# Patient Record
Sex: FEMALE | Race: WHITE | Employment: OTHER | URBAN - METROPOLITAN AREA
[De-identification: names, ages, dates, MRNs, and addresses within clinical notes are randomized per-mention and may not be internally consistent; named-entity substitution may affect disease eponyms.]

---

## 2022-03-19 PROBLEM — I10 ESSENTIAL HYPERTENSION: Status: ACTIVE | Noted: 2018-03-02

## 2022-03-19 PROBLEM — M47.818 ARTHRITIS OF SACRUM: Status: ACTIVE | Noted: 2018-03-02

## 2024-03-07 RX ORDER — GABAPENTIN 300 MG/1
300 CAPSULE ORAL 3 TIMES DAILY
COMMUNITY

## 2024-03-07 RX ORDER — LEVETIRACETAM 500 MG/1
500 TABLET ORAL 2 TIMES DAILY
COMMUNITY

## 2024-03-07 RX ORDER — OXYCODONE HYDROCHLORIDE 5 MG/1
5 TABLET ORAL 2 TIMES DAILY
COMMUNITY

## 2024-03-07 RX ORDER — METAXALONE 800 MG/1
800 TABLET ORAL 2 TIMES DAILY
COMMUNITY

## 2024-03-07 RX ORDER — LORATADINE 10 MG/1
10 CAPSULE, LIQUID FILLED ORAL DAILY
COMMUNITY

## 2024-03-07 NOTE — PROGRESS NOTES
Instructions for your surgery at [facility]      Today's Date:  3/7/2024      Patient's Name:  Abhishek Mattson           Surgery Date:  03/21/2024              Please enter the main entrance of the hospital and check-in at the  located in the lobby. Once checked in at the , you will take the elevators to the second floor, and report to the waiting room on the left. The room will say Procedure Registration.    Do NOT eat or drink anything, including candy, gum, or ice chips after midnight prior to your surgery, unless you have specific instructions from your surgeon or anesthesia provider to do so.  Brush your teeth before coming to the hospital. You may swish with water, but do not swallow.  No smoking/Vaping/E-Cigarettes 24 hours prior to the day of surgery.  No alcohol 24 hours prior to the day of surgery.  No recreational drugs for one week prior to the day of surgery.  Bring Photo ID, Insurance information, and Co-pay if required on day of surgery.  Bring in pertinent legal documents, such as, Medical Power of , DNR, Advance Directive, etc.  Leave all valuables, including money/purse, at home.  Remove all jewelry, including ALL body piercings, nail polish, acrylic nails, and makeup (including mascara); no lotions, powders, deodorant, or perfume/cologne/after shave on the skin.  Follow instruction for Hibiclens washes and CHG wipes from surgeon's office.   Glasses and dentures may be worn to the hospital. They must be removed prior to surgery. Please bring case/container for glasses or dentures.   Contact lenses should not be worn on day of surgery.   Call your doctor's office if symptoms of a cold or illness develop within 24-48 hours prior to your surgery.  Call your doctor's office if you have any questions concerning insurance or co-pays.  15. AN ADULT (relative or friend 18 years or older) MUST DRIVE YOU HOME AFTER YOUR SURGERY.  16. Please make

## 2024-03-18 ENCOUNTER — ANESTHESIA EVENT (OUTPATIENT)
Facility: HOSPITAL | Age: 64
End: 2024-03-18
Payer: OTHER GOVERNMENT

## 2024-03-21 ENCOUNTER — ANESTHESIA (OUTPATIENT)
Facility: HOSPITAL | Age: 64
End: 2024-03-21
Payer: OTHER GOVERNMENT

## 2024-03-21 ENCOUNTER — HOSPITAL ENCOUNTER (OUTPATIENT)
Facility: HOSPITAL | Age: 64
Setting detail: OUTPATIENT SURGERY
Discharge: HOME | End: 2024-03-21
Attending: DENTIST | Admitting: DENTIST
Payer: OTHER GOVERNMENT

## 2024-03-21 VITALS
BODY MASS INDEX: 36.46 KG/M2 | HEIGHT: 65 IN | SYSTOLIC BLOOD PRESSURE: 159 MMHG | WEIGHT: 218.8 LBS | TEMPERATURE: 98.8 F | DIASTOLIC BLOOD PRESSURE: 76 MMHG | HEART RATE: 84 BPM | RESPIRATION RATE: 16 BRPM | OXYGEN SATURATION: 94 %

## 2024-03-21 PROCEDURE — 6370000000 HC RX 637 (ALT 250 FOR IP): Performed by: NURSE ANESTHETIST, CERTIFIED REGISTERED

## 2024-03-21 PROCEDURE — 7100000000 HC PACU RECOVERY - FIRST 15 MIN: Performed by: DENTIST

## 2024-03-21 PROCEDURE — 2580000003 HC RX 258: Performed by: NURSE ANESTHETIST, CERTIFIED REGISTERED

## 2024-03-21 PROCEDURE — 7100000001 HC PACU RECOVERY - ADDTL 15 MIN: Performed by: DENTIST

## 2024-03-21 PROCEDURE — 3700000001 HC ADD 15 MINUTES (ANESTHESIA): Performed by: DENTIST

## 2024-03-21 PROCEDURE — 2709999900 HC NON-CHARGEABLE SUPPLY: Performed by: DENTIST

## 2024-03-21 PROCEDURE — 82962 GLUCOSE BLOOD TEST: CPT

## 2024-03-21 PROCEDURE — 3600000002 HC SURGERY LEVEL 2 BASE: Performed by: DENTIST

## 2024-03-21 PROCEDURE — 3700000000 HC ANESTHESIA ATTENDED CARE: Performed by: DENTIST

## 2024-03-21 PROCEDURE — 6360000002 HC RX W HCPCS: Performed by: NURSE ANESTHETIST, CERTIFIED REGISTERED

## 2024-03-21 PROCEDURE — 7100000010 HC PHASE II RECOVERY - FIRST 15 MIN: Performed by: DENTIST

## 2024-03-21 PROCEDURE — 7100000011 HC PHASE II RECOVERY - ADDTL 15 MIN: Performed by: DENTIST

## 2024-03-21 PROCEDURE — 2500000003 HC RX 250 WO HCPCS: Performed by: NURSE ANESTHETIST, CERTIFIED REGISTERED

## 2024-03-21 PROCEDURE — 3600000012 HC SURGERY LEVEL 2 ADDTL 15MIN: Performed by: DENTIST

## 2024-03-21 PROCEDURE — 2500000003 HC RX 250 WO HCPCS: Performed by: DENTIST

## 2024-03-21 RX ORDER — IBUPROFEN 800 MG/1
800 TABLET ORAL EVERY 6 HOURS PRN
Qty: 25 TABLET | Refills: 1 | Status: SHIPPED | OUTPATIENT
Start: 2024-03-21 | End: 2024-03-26

## 2024-03-21 RX ORDER — OXYCODONE HYDROCHLORIDE 5 MG/1
5 TABLET ORAL
Status: COMPLETED | OUTPATIENT
Start: 2024-03-21 | End: 2024-03-21

## 2024-03-21 RX ORDER — FENTANYL CITRATE 50 UG/ML
INJECTION, SOLUTION INTRAMUSCULAR; INTRAVENOUS PRN
Status: DISCONTINUED | OUTPATIENT
Start: 2024-03-21 | End: 2024-03-21 | Stop reason: SDUPTHER

## 2024-03-21 RX ORDER — NALOXONE HYDROCHLORIDE 0.4 MG/ML
INJECTION, SOLUTION INTRAMUSCULAR; INTRAVENOUS; SUBCUTANEOUS PRN
Status: DISCONTINUED | OUTPATIENT
Start: 2024-03-21 | End: 2024-03-21 | Stop reason: HOSPADM

## 2024-03-21 RX ORDER — SODIUM CHLORIDE, SODIUM LACTATE, POTASSIUM CHLORIDE, CALCIUM CHLORIDE 600; 310; 30; 20 MG/100ML; MG/100ML; MG/100ML; MG/100ML
INJECTION, SOLUTION INTRAVENOUS CONTINUOUS
Status: DISCONTINUED | OUTPATIENT
Start: 2024-03-21 | End: 2024-03-21 | Stop reason: HOSPADM

## 2024-03-21 RX ORDER — PROCHLORPERAZINE EDISYLATE 5 MG/ML
5 INJECTION INTRAMUSCULAR; INTRAVENOUS
Status: DISCONTINUED | OUTPATIENT
Start: 2024-03-21 | End: 2024-03-21 | Stop reason: HOSPADM

## 2024-03-21 RX ORDER — LIDOCAINE HYDROCHLORIDE 20 MG/ML
INJECTION, SOLUTION EPIDURAL; INFILTRATION; INTRACAUDAL; PERINEURAL PRN
Status: DISCONTINUED | OUTPATIENT
Start: 2024-03-21 | End: 2024-03-21 | Stop reason: SDUPTHER

## 2024-03-21 RX ORDER — METOCLOPRAMIDE HYDROCHLORIDE 5 MG/ML
10 INJECTION INTRAMUSCULAR; INTRAVENOUS
Status: DISCONTINUED | OUTPATIENT
Start: 2024-03-21 | End: 2024-03-21 | Stop reason: HOSPADM

## 2024-03-21 RX ORDER — PROPOFOL 10 MG/ML
INJECTION, EMULSION INTRAVENOUS PRN
Status: DISCONTINUED | OUTPATIENT
Start: 2024-03-21 | End: 2024-03-21 | Stop reason: SDUPTHER

## 2024-03-21 RX ORDER — LIDOCAINE HYDROCHLORIDE 10 MG/ML
1 INJECTION, SOLUTION EPIDURAL; INFILTRATION; INTRACAUDAL; PERINEURAL
Status: DISCONTINUED | OUTPATIENT
Start: 2024-03-21 | End: 2024-03-21 | Stop reason: HOSPADM

## 2024-03-21 RX ORDER — ACETAMINOPHEN 325 MG/1
650 TABLET ORAL
Status: DISCONTINUED | OUTPATIENT
Start: 2024-03-21 | End: 2024-03-21 | Stop reason: HOSPADM

## 2024-03-21 RX ORDER — MIDAZOLAM HYDROCHLORIDE 1 MG/ML
INJECTION INTRAMUSCULAR; INTRAVENOUS PRN
Status: DISCONTINUED | OUTPATIENT
Start: 2024-03-21 | End: 2024-03-21 | Stop reason: SDUPTHER

## 2024-03-21 RX ORDER — FAMOTIDINE 20 MG/1
20 TABLET, FILM COATED ORAL ONCE
Status: COMPLETED | OUTPATIENT
Start: 2024-03-21 | End: 2024-03-21

## 2024-03-21 RX ORDER — MEPERIDINE HYDROCHLORIDE 25 MG/ML
12.5 INJECTION INTRAMUSCULAR; INTRAVENOUS; SUBCUTANEOUS EVERY 5 MIN PRN
Status: DISCONTINUED | OUTPATIENT
Start: 2024-03-21 | End: 2024-03-21 | Stop reason: HOSPADM

## 2024-03-21 RX ORDER — SUCCINYLCHOLINE/SOD CL,ISO/PF 100 MG/5ML
SYRINGE (ML) INTRAVENOUS PRN
Status: DISCONTINUED | OUTPATIENT
Start: 2024-03-21 | End: 2024-03-21 | Stop reason: SDUPTHER

## 2024-03-21 RX ORDER — CLINDAMYCIN HYDROCHLORIDE 300 MG/1
300 CAPSULE ORAL 3 TIMES DAILY
Qty: 21 CAPSULE | Refills: 0 | Status: SHIPPED | OUTPATIENT
Start: 2024-03-21 | End: 2024-03-28

## 2024-03-21 RX ORDER — ONDANSETRON 2 MG/ML
INJECTION INTRAMUSCULAR; INTRAVENOUS PRN
Status: DISCONTINUED | OUTPATIENT
Start: 2024-03-21 | End: 2024-03-21 | Stop reason: SDUPTHER

## 2024-03-21 RX ORDER — FENTANYL CITRATE 50 UG/ML
25 INJECTION, SOLUTION INTRAMUSCULAR; INTRAVENOUS EVERY 5 MIN PRN
Status: DISCONTINUED | OUTPATIENT
Start: 2024-03-21 | End: 2024-03-21 | Stop reason: HOSPADM

## 2024-03-21 RX ORDER — LIDOCAINE HYDROCHLORIDE AND EPINEPHRINE 10; 10 MG/ML; UG/ML
INJECTION, SOLUTION INFILTRATION; PERINEURAL PRN
Status: DISCONTINUED | OUTPATIENT
Start: 2024-03-21 | End: 2024-03-21 | Stop reason: HOSPADM

## 2024-03-21 RX ORDER — ROCURONIUM BROMIDE 10 MG/ML
INJECTION, SOLUTION INTRAVENOUS PRN
Status: DISCONTINUED | OUTPATIENT
Start: 2024-03-21 | End: 2024-03-21 | Stop reason: SDUPTHER

## 2024-03-21 RX ADMIN — Medication 100 MG: at 08:36

## 2024-03-21 RX ADMIN — SODIUM CHLORIDE, POTASSIUM CHLORIDE, SODIUM LACTATE AND CALCIUM CHLORIDE: 600; 310; 30; 20 INJECTION, SOLUTION INTRAVENOUS at 07:37

## 2024-03-21 RX ADMIN — OXYCODONE HYDROCHLORIDE 5 MG: 5 TABLET ORAL at 14:39

## 2024-03-21 RX ADMIN — LIDOCAINE HYDROCHLORIDE 50 MG: 20 INJECTION, SOLUTION EPIDURAL; INFILTRATION; INTRACAUDAL; PERINEURAL at 08:36

## 2024-03-21 RX ADMIN — FAMOTIDINE 20 MG: 20 TABLET ORAL at 07:38

## 2024-03-21 RX ADMIN — FENTANYL CITRATE 25 MCG: 50 INJECTION INTRAMUSCULAR; INTRAVENOUS at 12:15

## 2024-03-21 RX ADMIN — ONDANSETRON 4 MG: 2 INJECTION INTRAMUSCULAR; INTRAVENOUS at 08:52

## 2024-03-21 RX ADMIN — PROPOFOL 150 MG: 10 INJECTION, EMULSION INTRAVENOUS at 08:36

## 2024-03-21 RX ADMIN — FENTANYL CITRATE 50 MCG: 50 INJECTION INTRAMUSCULAR; INTRAVENOUS at 08:36

## 2024-03-21 RX ADMIN — ROCURONIUM BROMIDE 5 MG: 10 INJECTION, SOLUTION INTRAVENOUS at 08:36

## 2024-03-21 RX ADMIN — MIDAZOLAM 2 MG: 1 INJECTION, SOLUTION INTRAMUSCULAR; INTRAVENOUS at 08:29

## 2024-03-21 ASSESSMENT — PAIN SCALES - GENERAL
PAINLEVEL_OUTOF10: 6

## 2024-03-21 ASSESSMENT — PAIN DESCRIPTION - LOCATION
LOCATION: TEETH
LOCATION: MOUTH;EAR
LOCATION: MOUTH;EAR
LOCATION: EAR;MOUTH

## 2024-03-21 ASSESSMENT — PAIN DESCRIPTION - DESCRIPTORS
DESCRIPTORS: SHARP

## 2024-03-21 ASSESSMENT — PAIN DESCRIPTION - ORIENTATION
ORIENTATION: RIGHT

## 2024-03-21 ASSESSMENT — PAIN - FUNCTIONAL ASSESSMENT: PAIN_FUNCTIONAL_ASSESSMENT: 0-10

## 2024-03-21 NOTE — ANESTHESIA PRE PROCEDURE
Department of Anesthesiology  Preprocedure Note       Name:  Abhishek Mattson   Age:  63 y.o.  :  1960                                          MRN:  [id number]         Date:  3/21/2024      Surgeon: Surgeon(s):  Govind Aguiar DDS    Procedure: Procedure(s):  EXTRACT TEETH #1,16,17,32    Medications prior to admission:   Prior to Admission medications    Medication Sig Start Date End Date Taking? Authorizing Provider   loratadine (CLARITIN) 10 MG capsule Take 1 capsule by mouth daily   Yes Provider, MD Catarina   gabapentin (NEURONTIN) 300 MG capsule Take 1 capsule by mouth 3 times daily.   Yes Provider, MD Catarina   metaxalone (SKELAXIN) 800 MG tablet Take 1 tablet by mouth 2 times daily   Yes ProviderCatarina MD   oxyCODONE (ROXICODONE) 5 MG immediate release tablet Take 1 tablet by mouth in the morning and at bedtime.   Yes ProviderCatarina MD   levETIRAcetam (KEPPRA) 500 MG tablet Take 1 tablet by mouth 2 times daily   Yes Provider, MD Catarina       Current medications:    Current Facility-Administered Medications   Medication Dose Route Frequency Provider Last Rate Last Admin   • lidocaine PF 1 % injection 1 mL  1 mL IntraDERmal Once PRN Magaly Robles APRN - CRNA       • lactated ringers IV soln infusion   IntraVENous Continuous Magaly Robles APRN - CRNA 125 mL/hr at 24 0737 New Bag at 24 0737       Allergies:    Allergies   Allergen Reactions   • Latex Swelling   • Pcn [Penicillins] Anaphylaxis   • Aspartame Seizure   • Gluten Seizure   • Vancomycin Swelling   • Hydrocodone Nausea And Vomiting   • Mobic [Meloxicam] Nausea And Vomiting and Dizziness or Vertigo   • Oat Diarrhea and Nausea And Vomiting   • Red Dye Nausea And Vomiting, Swelling and Rash       Problem List:    Patient Active Problem List   Diagnosis Code   • Arthritis of sacrum M47.818   • Lumbar radicular pain M54.16   • Cervical radicular pain M54.12   • Pseudoseizure F44.5   • Essential

## 2024-03-21 NOTE — BRIEF OP NOTE
Brief Postoperative Note      Patient: Abhishek Mattson  YOB: 1960  MRN: [id number]    Date of Procedure: 3/21/2024    Pre-Op Diagnosis Codes:     * Unerupted tooth [K00.6]     * Impacted tooth [K01.1]    Post-Op Diagnosis: Same       Procedure(s):  EXTRACT TEETH #1,16,17,32    Surgeon(s):  Govind Aguiar DDS    Assistant:  * No surgical staff found *    Anesthesia: General    Estimated Blood Loss (mL): Minimal    Complications: None    Specimens:   * No specimens in log *    Implants:  * No implants in log *      Drains: * No LDAs found *    Findings: caries      Electronically signed by Govind Aguiar DDS on 3/21/2024 at 9:26 AM

## 2024-03-21 NOTE — PERIOP NOTE
Patient /Family /Designee has been informed that [facility] is not responsible for patient belongings per policy and the signed [facility] Patient Agreement document.  Personal items should be sent home or checked in with security.  Patient /Family /Designee selected the following action:                            [x]  Send personal items home with a family member or friend                                                 []  Check in personal items with security, excluding clothing                            []  Maintain personal items at the bedside, against recommendation                                 by Alcides Lindquist [facility]                                   ** If patient /family /designee chooses to maintain personal items at the bedside,                                      Complete the patient belongings inventory in the EMR.   Belongings are with patient's , Arnol Mattson.  Contact number: [phone]

## 2024-03-21 NOTE — DISCHARGE SUMMARY
[unfilled]    Discharge Summary     Patient: Abhishek Mattson MRN: [id number]  SSN: [id number]    YOB: 1960  Age: 63 y.o.  Sex: female       Admit Date: 3/21/2024    Discharge Date: 3/21/2024      Admission Diagnoses: Unerupted tooth [K00.6]  Impacted tooth [K01.1]    Discharge Diagnoses:      Discharge Condition: Good    Hospital Course: sds    Consults: None    Significant Diagnostic Studies: labs:     Disposition: home    Discharge Medications:   Current Discharge Medication List        START taking these medications    Details   clindamycin (CLEOCIN) 300 MG capsule Take 1 capsule by mouth 3 times daily for 7 days  Qty: 21 capsule, Refills: 0      ibuprofen (ADVIL;MOTRIN) 800 MG tablet Take 1 tablet by mouth every 6 hours as needed for Pain  Qty: 25 tablet, Refills: 1           CONTINUE these medications which have NOT CHANGED    Details   loratadine (CLARITIN) 10 MG capsule Take 1 capsule by mouth daily      gabapentin (NEURONTIN) 300 MG capsule Take 1 capsule by mouth 3 times daily.      metaxalone (SKELAXIN) 800 MG tablet Take 1 tablet by mouth 2 times daily      oxyCODONE (ROXICODONE) 5 MG immediate release tablet Take 1 tablet by mouth in the morning and at bedtime.      levETIRAcetam (KEPPRA) 500 MG tablet Take 1 tablet by mouth 2 times daily             Activity: activity as tolerated and no driving for today  Diet: clear liquids, advance as tolerated  Wound Care: as directed    No follow-ups on file.    Signed By: Govind Aguiar DDS     March 21, 2024    [unfilled]

## 2024-03-21 NOTE — OP NOTE
[unfilled]   Operative Report    Patient: Abhishek Mattson MRN: [id number]  SSN: [id number]    YOB: 1960  Age: 63 y.o.  Sex: female       Date of Surgery: [unfilled]     Preoperative Diagnosis: Unerupted tooth [K00.6]  Impacted tooth [K01.1]     Postoperative Diagnosis: * No post-op diagnosis entered *     Surgeon(s) and Role:     * Govind Aguiar DDS - Primary    Anesthesia: General     Procedure: Procedure(s):  Surgical extractions # 1 16 17 32  Hospital Visit    Follow up: The patient was instructed to follow up at the office in one week.  The patient was given appropriate postoperative prescriptions with directions.     Preop/Indications:  The patient was seen previously on an outpatient basis.  Following physical examination and review of medical history it was recommended to be done in outpatient hospital setting with intubated general anesthesia.      Procedure in Detail:   After the patient was properly identified by Anesthesia, appropriately consented, the patient was taken back to the operating room.  Via smooth IV induction, the patient was intubated atraumatically.  The patient was turned over to Oral Surgery, prepped and draped in a normal sterilel fashion for intraoral surgical procedures.  The mouth was thoroughly suctioned and inspected with the Yankauer suction.  A throat pack was Placed. Anesthesia was informed that the throat pack was placed.  A total of 15 cc 2% xylocaine with 100,000 epinephrine was infiltrated for bilateral mandibular and maxillary blocks.     Attention was focused to the patients left side.  A distobucal incision was made with a 15 blade overlying the area of tooth #17.  A full thickness subperiosteol flap was eleveated atraumatically.  All nerve, arteries, and tissue were retracted atraumatically.    Tooth #17 was visualized and deemed to be a surgical extraction.  Overlying bone was removed with a round bur.  The tooth was then visuallized and

## 2024-03-21 NOTE — ANESTHESIA POSTPROCEDURE EVALUATION
Department of Anesthesiology  Postprocedure Note    Patient: Abhishek Mattson  MRN: [id number]  YOB: 1960  Date of evaluation: 3/21/2024    Procedure Summary       Date: 03/21/24 Room / Location: [facility] MAIN 03 / [facility] MAIN OR    Anesthesia Start: 0826 Anesthesia Stop: 0908    Procedure: EXTRACT TEETH #1,16,17,32 (Mouth) Diagnosis:       Unerupted tooth      Impacted tooth      (Unerupted tooth [K00.6])      (Impacted tooth [K01.1])    Surgeons: Govind Aguiar DDS Responsible Provider: Sean Trujillo MD    Anesthesia Type: General ASA Status: 3            Anesthesia Type: General    Leanne Phase I: Leanne Score: 9    Leanne Phase II:      Anesthesia Post Evaluation    Patient location during evaluation: bedside  Patient participation: complete - patient participated  Level of consciousness: awake  Pain score: 6  Airway patency: patent  Nausea & Vomiting: no nausea  Cardiovascular status: hemodynamically stable  Respiratory status: acceptable  Hydration status: euvolemic  Pain management: satisfactory to patient        No notable events documented.

## 2024-03-21 NOTE — PERIOP NOTE
1112-- Patient beginning to arouse/awaken, when asked to hold fingers up to provide pain rating on 0-10 scale, patient noted to have jerky arm/hand movement, with fingers noting to contract inwards toward palm with thumb and pinky touching.  Patient able to say \"sharp\" clearly, make eye contact, and follow directions while hands/arms are jerking.  Vital signs remained stable.  Dr. Trujillo aware, at bedside to assess, per Dr. Trujillo, this was not patient's baseline prior to procedure in preop.  Per Dr. Trujillo continue to monitor patient.    1118-- Surgical Assistant with Dr. Aguiar assessed patient's \"tremor\", will notify Dr. Aguiar to assess pt.    1210-- Per Dr. Aguiar, he is unaware if tremors/jerking to upper extremities is patient's baseline.  Per Dr. Aguiar, have patient's  come to PACU, assess pt to determine if jerking/tremors are normal for patient.    1218-- Arnol Mattson () at bedside with patient in PACU, curtains drawn for patient privacy, patient holding 's hand, per  this is normal for patient, especially after anesthesia.  Per Arnol \"She may sometimes even get a foot drop and I may have to scoop her up and put her in the wheel chair to get her in the house.\"  Arnol asked if he feels he can take care of patient at home, Arnol stated \"Yes, I do it all the time.  I am used to having to take care of her like this.\"  Patient remains resting in bed with eyes closed, VSS, will continue to monitor and observe.    1228-- Dr. Trujillo aware, per Dr. Trujillo he will discuss with Dr. Aguiar, and inform nursing if patient is ok to transfer to Phase 2 for discharge.   Patient remains resting in bed with eyes closed, VSS, will continue to monitor and observe.    1305-- Per Dr. Trujillo he discussed with Dr. Aguiar, patient is ok to transfer to Phase 2 for discharge.

## 2024-03-21 NOTE — DISCHARGE INSTRUCTIONS
[facility]  Call office [phone] with any questions or visit [email or web address]    DAY OF SURGERY:  Immediately after surgery. Patients who received a general anesthetic should return home from the office immediately upon discharge, and lie down with the head elevated until all the effects of the anesthetic has disappeared.  Anesthetic effects vary by individual, and you may feel drowsy for a short period of time or for several hours.  You should not operate any mechanical equipment or drive a motor vehicle for at least 12 hours or longer if you feel any residual effect from the anesthetic.  1. Do not drive or use appliances or equipment that could be dangerous, suck as power tools, stoves, burners,  and garbage disposals.  2. Watch our for dizziness. Walk slowly and take your time. Sudden changes of position can also cause nausea.  3. Do not make any important decisions. You may change your mind tomorrow.  4. Do not drink any alcoholic beverages. The drugs in your body may cause your reaction to alcohol to be dangerous.  5. Diet: If you feel nauseated or sick to your stomach, drink clear liquids like 7-up, broth, apple juice, ginger ale, tea or cola or eat jello. If these liquids do not make you sick to your stomach try eating soft foods like potatoes, rice, pasta and cereal.  6. Discuss any questions you may have with your surgeon, Dr. Aguiar or Dr. Taylor, who can be reached at [phone].  7. In the event of a medical emergency, call 911.    ORAL HYGIENE AND CARE: Do not disturb the surgical area today. Bite down gently but firmly on the gauze pack that we have initially placed over the surgical area making sure that they remain in place. Do not change them for the first hour unless the bleeding is not being controlled. This is important to allow blood clot formation on the surgical site. The gauze may be changed when necessary and/or repositioned for comfort. DO

## 2024-03-21 NOTE — H&P
Day of Surgery Update:  Abhishek Mattson was seen and examined.  History and physical has been reviewed. The patient has been examined. There have been no significant clinical changes since the completion of the originally dated History and Physical.    Signed By: Govind Aguiar DDS     March 21, 2024 8:17 AM

## 2024-03-22 LAB — GLUCOSE BLD STRIP.AUTO-MCNC: 95 MG/DL (ref 70–110)

## 2024-03-22 NOTE — PERIOP NOTE
Assumed care of patient in phase I after report from СЕРГЕЙ Kamara RN. Patient resting quietly. No evidence or seizures or tremors of any kind.    1400 Report given on patient to phase 2RN.  instructed how to get to discharge area.    1405 Patient transported to phase 2 room 6 without evidence of seizures.

## (undated) DEVICE — SYRINGE MED 10ML TRNSLUC BRL PLUNG BLK MRK POLYPR CTRL

## (undated) DEVICE — SURGIFOAM SPNG SZ 12-7

## (undated) DEVICE — NEEDLE HYPO 25GA L1.5IN BLU POLYPR HUB S STL REG BVL STR

## (undated) DEVICE — SUTURE VCRL SZ 4-0 L27IN ABSRB UD L19MM PS-2 3/8 CIR PRIM J426H

## (undated) DEVICE — PACKING GZ W2INXL6FT WVN COT VAG RADPQ

## (undated) DEVICE — 10FR FRAZIER SUCTION HANDLE: Brand: CARDINAL HEALTH

## (undated) DEVICE — MAJOR: Brand: MEDLINE INDUSTRIES, INC.

## (undated) DEVICE — INTENDED FOR TISSUE SEPARATION, AND OTHER PROCEDURES THAT REQUIRE A SHARP SURGICAL BLADE TO PUNCTURE OR CUT.: Brand: BARD-PARKER ® CARBON RIB-BACK BLADES

## (undated) DEVICE — MAGNETIC INSTRUMENT PAD 10" X 16"; MEDIUM; DISPOSABLE: Brand: CARDINAL HEALTH

## (undated) DEVICE — SYRINGE 20ML LL S/C 50

## (undated) DEVICE — SPONGE GZ W4XL4IN COT 12 PLY TYP VII WVN C FLD DSGN STERILE

## (undated) DEVICE — CANNULA PERF L2IN BLNT TIP 2MM VES CLR RADPQ BODY FEM LUER

## (undated) DEVICE — MEDI-VAC SUCTION HANDLE REGULAR CAPACITY: Brand: CARDINAL HEALTH

## (undated) DEVICE — SOLUTION IRRIG 1000ML 0.9% SOD CHL USP POUR PLAS BTL

## (undated) DEVICE — KENDALL SCD EXPRESS SLEEVES, KNEE LENGTH, MEDIUM: Brand: KENDALL SCD

## (undated) DEVICE — STERILE LATEX POWDER-FREE SURGICAL GLOVESWITH NITRILE COATING: Brand: PROTEXIS

## (undated) DEVICE — SHEET, DRAPE, SPLIT, STERILE: Brand: MEDLINE